# Patient Record
Sex: FEMALE | Race: WHITE | HISPANIC OR LATINO
[De-identification: names, ages, dates, MRNs, and addresses within clinical notes are randomized per-mention and may not be internally consistent; named-entity substitution may affect disease eponyms.]

---

## 2018-10-29 ENCOUNTER — CHARTING TRANS (OUTPATIENT)
Dept: OTHER | Age: 23
End: 2018-10-29

## 2018-10-31 ENCOUNTER — CHARTING TRANS (OUTPATIENT)
Dept: OTHER | Age: 23
End: 2018-10-31

## 2018-11-27 VITALS — TEMPERATURE: 98.2 F

## 2019-10-22 ENCOUNTER — HOSPITAL ENCOUNTER (OUTPATIENT)
Age: 24
Discharge: HOME | End: 2019-10-22
Attending: FAMILY MEDICINE
Payer: MEDICAID

## 2019-10-22 ENCOUNTER — HOSPITAL ENCOUNTER (OUTPATIENT)
Age: 24
Discharge: STILL ADMITTED OR ADMINISTRATIVE | End: 2019-10-22

## 2019-10-22 VITALS
SYSTOLIC BLOOD PRESSURE: 124 MMHG | WEIGHT: 130 LBS | HEIGHT: 59 IN | DIASTOLIC BLOOD PRESSURE: 79 MMHG | HEART RATE: 84 BPM | RESPIRATION RATE: 18 BRPM | OXYGEN SATURATION: 98 % | TEMPERATURE: 98 F | BODY MASS INDEX: 26.21 KG/M2

## 2019-10-22 DIAGNOSIS — B02.9 HERPES ZOSTER WITHOUT COMPLICATION: Primary | ICD-10-CM

## 2019-10-22 PROCEDURE — 99203 OFFICE O/P NEW LOW 30 MIN: CPT

## 2019-10-22 PROCEDURE — 99204 OFFICE O/P NEW MOD 45 MIN: CPT

## 2019-10-22 RX ORDER — VALACYCLOVIR HYDROCHLORIDE 1 G/1
1 TABLET, FILM COATED ORAL 3 TIMES DAILY
Qty: 21 TABLET | Refills: 0 | Status: SHIPPED | OUTPATIENT
Start: 2019-10-22 | End: 2019-10-29

## 2019-10-22 RX ORDER — MYCOPHENOLATE MOFETIL 250 MG/1
500 CAPSULE ORAL
COMMUNITY
End: 2021-04-05 | Stop reason: ALTCHOICE

## 2019-10-22 NOTE — ED PROVIDER NOTES
Patient Seen in: [facility]      History   Patient presents with:  Rash Skin Problem (integumentary)  Upper Extremity Injury (musculoskeletal)  Neck Pain (musculoskeletal, neurologic)    Stated Complaint: left sjoulder pain cooperative  Head: Normocephalic, without obvious abnormality, atraumatic  Eyes: conjunctivae/corneas clear. PERRL, EOM's intact. Fundi benign. Ears: normal TM's and external ear canals both ears  Nose: Nares normal. Septum midline.  Mucosa normal. No drai

## 2019-10-22 NOTE — ED INITIAL ASSESSMENT (HPI)
Pt c/o rash behind left ear and to her left scalp. Pt also c/o left shoulder and neck pain. Pt states she was working out before shoulder and neck pain started.

## 2020-01-18 ENCOUNTER — OFFICE VISIT (OUTPATIENT)
Dept: FAMILY MEDICINE CLINIC | Facility: CLINIC | Age: 25
End: 2020-01-18
Payer: MEDICAID

## 2020-01-18 VITALS
OXYGEN SATURATION: 98 % | BODY MASS INDEX: 27.62 KG/M2 | TEMPERATURE: 98 F | SYSTOLIC BLOOD PRESSURE: 110 MMHG | HEIGHT: 59 IN | WEIGHT: 137 LBS | DIASTOLIC BLOOD PRESSURE: 78 MMHG | HEART RATE: 94 BPM

## 2020-01-18 DIAGNOSIS — L73.1 INGROWN HAIR: ICD-10-CM

## 2020-01-18 DIAGNOSIS — J06.9 VIRAL URI: Primary | ICD-10-CM

## 2020-01-18 PROCEDURE — 99203 OFFICE O/P NEW LOW 30 MIN: CPT | Performed by: NURSE PRACTITIONER

## 2020-01-18 RX ORDER — BENZONATATE 200 MG/1
200 CAPSULE ORAL 3 TIMES DAILY PRN
Qty: 30 CAPSULE | Refills: 0 | Status: SHIPPED | OUTPATIENT
Start: 2020-01-18 | End: 2020-03-19

## 2020-01-18 RX ORDER — FLUTICASONE PROPIONATE 50 MCG
2 SPRAY, SUSPENSION (ML) NASAL DAILY
Qty: 1 BOTTLE | Refills: 0 | Status: SHIPPED | OUTPATIENT
Start: 2020-01-18 | End: 2020-03-19

## 2020-01-18 RX ORDER — B-COMPLEX WITH VITAMIN C
1 TABLET ORAL
COMMUNITY
Start: 2016-10-20 | End: 2021-08-13

## 2020-01-18 NOTE — PROGRESS NOTES
CHIEF COMPLAINT:   Patient presents with:  Cough: Productive/dry cough X 5 days, nasal congestion, runny nose, sore throat. No NV or fever. HPI:   Sarina Ayers is a 23year old female who presents for upper respiratory symptoms for  5 days.  Pa /78   Pulse 94   Temp 98.3 °F (36.8 °C) (Oral)   Ht 59\"   Wt 137 lb (62.1 kg)   LMP 01/06/2020   SpO2 98%   BMI 27.67 kg/m²   GENERAL: well developed, well nourished,in no apparent distress  SKIN: no rashes,no suspicious lesions.  1 small papule note You have a viral upper respiratory illness (URI), which is another term for the common cold. This illness is contagious during the first few days. It is spread through the air by coughing and sneezing.  It may also be spread by direct contact (touching the · Over-the-counter cold medicines will not shorten the length of time you’re sick, but they may be helpful for the following symptoms: cough, sore throat, and nasal and sinus congestion.  If you take prescription medicines, ask your healthcare provider or p Folliculitis can happen anywhere on the body where hair grows. It can be caused by rubbing from tight clothing. Ingrown hairs can cause it. Soaking in a hot tub or swimming pool that has bacteria in the water can cause it.  It may also occur if a hair folli · Rash does not get better with treatment  · Redness or swelling that gets worse  · Rash becomes more painful  · Foul-smelling fluid leaking from the skin  · Rash improves, but then comes back   Date Last Reviewed: 11/1/2016  © 4227-9013 [employer]

## 2020-01-18 NOTE — PATIENT INSTRUCTIONS
Viral Upper Respiratory Illness (Adult)    You have a viral upper respiratory illness (URI), which is another term for the common cold. This illness is contagious during the first few days. It is spread through the air by coughing and sneezing.  It may al · Over-the-counter cold medicines will not shorten the length of time you’re sick, but they may be helpful for the following symptoms: cough, sore throat, and nasal and sinus congestion.  If you take prescription medicines, ask your healthcare provider or p Folliculitis can happen anywhere on the body where hair grows. It can be caused by rubbing from tight clothing. Ingrown hairs can cause it. Soaking in a hot tub or swimming pool that has bacteria in the water can cause it.  It may also occur if a hair folli · Rash does not get better with treatment  · Redness or swelling that gets worse  · Rash becomes more painful  · Foul-smelling fluid leaking from the skin  · Rash improves, but then comes back   Date Last Reviewed: 11/1/2016  © 0853-8806 [employer]

## 2020-03-04 ENCOUNTER — APPOINTMENT (OUTPATIENT)
Dept: URGENT CARE | Age: 25
End: 2020-03-04

## 2020-03-05 ENCOUNTER — WALK IN (OUTPATIENT)
Dept: URGENT CARE | Age: 25
End: 2020-03-05

## 2020-03-05 DIAGNOSIS — Z11.1 SCREENING-PULMONARY TB: Primary | ICD-10-CM

## 2020-03-05 PROCEDURE — 86580 TB INTRADERMAL TEST: CPT | Performed by: NURSE PRACTITIONER

## 2020-03-08 ENCOUNTER — WALK IN (OUTPATIENT)
Dept: URGENT CARE | Age: 25
End: 2020-03-08

## 2020-03-08 DIAGNOSIS — Z11.1 ENCOUNTER FOR PPD SKIN TEST READING: Primary | ICD-10-CM

## 2020-03-08 LAB
INDURATION: 0 MM (ref 0–?)
SKIN TEST RESULT: NEGATIVE

## 2020-03-08 PROCEDURE — X1094 NO CHARGE VISIT: HCPCS | Performed by: NURSE PRACTITIONER

## 2021-03-02 PROBLEM — N05.9 NEPHRITIS DUE TO AUTOIMMUNE DISEASE (HCC): Status: ACTIVE | Noted: 2021-03-02

## 2021-03-02 PROBLEM — M35.9 NEPHRITIS DUE TO AUTOIMMUNE DISEASE (HCC): Status: ACTIVE | Noted: 2021-03-02

## 2021-03-09 PROBLEM — M32.9 LUPUS (HCC): Status: ACTIVE | Noted: 2021-03-09

## 2021-03-09 PROBLEM — O09.619 SUPERVISION OF HIGH-RISK PREGNANCY OF YOUNG PRIMIGRAVIDA: Status: ACTIVE | Noted: 2021-03-09

## 2021-03-09 PROBLEM — O09.619 SUPERVISION OF HIGH-RISK PREGNANCY OF YOUNG PRIMIGRAVIDA (HCC): Status: ACTIVE | Noted: 2021-03-09

## 2021-04-05 PROBLEM — M32.9 SYSTEMIC LUPUS ERYTHEMATOSUS (SLE) AFFECTING PREGNANCY, ANTEPARTUM (HCC): Status: ACTIVE | Noted: 2021-04-05

## 2021-04-05 PROBLEM — O99.891 SYSTEMIC LUPUS ERYTHEMATOSUS (SLE) AFFECTING PREGNANCY, ANTEPARTUM (HCC): Status: ACTIVE | Noted: 2021-04-05

## 2021-06-02 PROBLEM — R76.8 SS-A ANTIBODY POSITIVE: Status: ACTIVE | Noted: 2021-06-02

## 2021-06-29 PROBLEM — O26.12 LOW WEIGHT GAIN DURING PREGNANCY IN SECOND TRIMESTER: Status: ACTIVE | Noted: 2021-06-29

## 2021-06-29 PROBLEM — O26.12: Status: ACTIVE | Noted: 2021-06-29

## 2021-08-13 PROBLEM — D64.9 ANEMIA: Status: ACTIVE | Noted: 2021-08-13

## 2021-08-13 PROBLEM — Z34.90 PREGNANT: Status: ACTIVE | Noted: 2021-01-05

## 2021-08-13 PROBLEM — M32.14 SLE GLOMERULONEPHRITIS SYNDROME (HCC): Status: ACTIVE | Noted: 2021-08-13

## 2021-08-13 PROBLEM — Z34.90 PREGNANT (HCC): Status: ACTIVE | Noted: 2021-01-05

## 2021-08-15 PROBLEM — O09.90: Status: ACTIVE | Noted: 2021-08-15

## 2021-08-15 PROBLEM — M32.9 SYSTEMIC LUPUS ERYTHEMATOSUS (SLE) AFFECTING PREGNANCY, ANTEPARTUM (HCC): Status: ACTIVE | Noted: 2021-08-15

## 2021-08-15 PROBLEM — O99.891 SYSTEMIC LUPUS ERYTHEMATOSUS (SLE) AFFECTING PREGNANCY, ANTEPARTUM (HCC): Status: ACTIVE | Noted: 2021-08-15

## 2021-08-15 PROBLEM — O09.90 HIGH-RISK PREGNANCY SUPERVISION, UNSPECIFIED TRIMESTER: Status: ACTIVE | Noted: 2021-08-15

## 2022-05-13 ENCOUNTER — OFFICE VISIT (OUTPATIENT)
Dept: RHEUMATOLOGY | Facility: CLINIC | Age: 27
End: 2022-05-13
Payer: MEDICAID

## 2022-05-13 VITALS
HEIGHT: 59 IN | OXYGEN SATURATION: 99 % | DIASTOLIC BLOOD PRESSURE: 66 MMHG | WEIGHT: 115 LBS | SYSTOLIC BLOOD PRESSURE: 100 MMHG | BODY MASS INDEX: 23.18 KG/M2 | HEART RATE: 76 BPM

## 2022-05-13 DIAGNOSIS — M32.9 SYSTEMIC LUPUS ERYTHEMATOSUS (SLE) IN ADULT (HCC): Primary | ICD-10-CM

## 2022-05-13 DIAGNOSIS — R79.89 LOW VITAMIN D LEVEL: ICD-10-CM

## 2022-05-13 DIAGNOSIS — M32.14 LUPUS NEPHRITIS, ISN/RPS CLASS III (HCC): ICD-10-CM

## 2022-05-13 DIAGNOSIS — M32.14 SYSTEMIC LUPUS ERYTHEMATOSUS WITH GLOMERULAR DISEASE, UNSPECIFIED SLE TYPE (HCC): ICD-10-CM

## 2022-05-13 DIAGNOSIS — E61.1 IRON DEFICIENCY: ICD-10-CM

## 2022-05-13 DIAGNOSIS — Z51.81 THERAPEUTIC DRUG MONITORING: ICD-10-CM

## 2022-05-13 PROCEDURE — 3078F DIAST BP <80 MM HG: CPT | Performed by: INTERNAL MEDICINE

## 2022-05-13 PROCEDURE — 99204 OFFICE O/P NEW MOD 45 MIN: CPT | Performed by: INTERNAL MEDICINE

## 2022-05-13 PROCEDURE — 3074F SYST BP LT 130 MM HG: CPT | Performed by: INTERNAL MEDICINE

## 2022-05-13 PROCEDURE — 3008F BODY MASS INDEX DOCD: CPT | Performed by: INTERNAL MEDICINE

## 2022-05-13 RX ORDER — LIDOCAINE HYDROCHLORIDE 20 MG/ML
1 SOLUTION ORAL; TOPICAL 2 TIMES DAILY
Qty: 180 TABLET | Refills: 0 | Status: SHIPPED | OUTPATIENT
Start: 2022-05-13

## 2022-05-13 RX ORDER — ERGOCALCIFEROL 1.25 MG/1
50000 CAPSULE ORAL WEEKLY
Qty: 12 CAPSULE | Refills: 0 | Status: SHIPPED | OUTPATIENT
Start: 2022-05-13

## 2022-05-13 NOTE — PATIENT INSTRUCTIONS
Vitamin D: take every week    Get repeat Urine tests now to recheck urine studies further away from period. Get blood work with next set of labs with Dr. Lexii Oreilly.     Read more about azathioprine

## 2022-05-18 ENCOUNTER — TELEPHONE (OUTPATIENT)
Dept: RHEUMATOLOGY | Facility: CLINIC | Age: 27
End: 2022-05-18

## 2022-05-18 NOTE — TELEPHONE ENCOUNTER
Talked to Dr. Emmanuel Mayberry, discussed plan. I will reach out to patient, options are 1) stop breastfeeding and resume MMF, 2) resume breastfeeding and start azathioprine.     I will reach out to patient

## 2022-05-26 ENCOUNTER — PATIENT MESSAGE (OUTPATIENT)
Dept: RHEUMATOLOGY | Facility: CLINIC | Age: 27
End: 2022-05-26

## 2022-06-07 ENCOUNTER — PATIENT MESSAGE (OUTPATIENT)
Dept: RHEUMATOLOGY | Facility: CLINIC | Age: 27
End: 2022-06-07

## 2022-06-07 NOTE — TELEPHONE ENCOUNTER
Pt returned our call; advised pt to review MyChart and respond via MyChart or call with any questions. She verbalized understanding and agreed to this plan.

## 2022-06-09 ENCOUNTER — TELEPHONE (OUTPATIENT)
Dept: RHEUMATOLOGY | Facility: CLINIC | Age: 27
End: 2022-06-09

## 2022-06-09 DIAGNOSIS — Z51.81 THERAPEUTIC DRUG MONITORING: ICD-10-CM

## 2022-06-09 DIAGNOSIS — M32.9 SYSTEMIC LUPUS ERYTHEMATOSUS (SLE) IN ADULT (HCC): Primary | ICD-10-CM

## 2022-06-24 ENCOUNTER — TELEPHONE (OUTPATIENT)
Dept: RHEUMATOLOGY | Facility: CLINIC | Age: 27
End: 2022-06-24

## 2022-06-29 ENCOUNTER — TELEPHONE (OUTPATIENT)
Dept: RHEUMATOLOGY | Facility: CLINIC | Age: 27
End: 2022-06-29

## 2022-06-30 NOTE — TELEPHONE ENCOUNTER
Jhon Willis,    The testing for the TPMT enzyme was normal. So it would be safe to start azathioprine. However, since your repeat urinalysis was normal, instead of starting azathioprine, what do you think about increasing the dose of hydroxychloroquine (plaquenil) to 200 mg twice daily (so double the current dose) for the next three months, then recheck the C3/C4 levels and urinalysis? If things look good then, we can slowly decrease the dose of hydroxychloroquine (plaquenil) back down to 200 mg daily. I think this would be a bit more straightforward than starting a new medication, then potentially changing that medication back to cellcept when you stop breastfeeding. What do you think?     Dr. Sandy Grider

## 2022-06-30 NOTE — TELEPHONE ENCOUNTER
----- Message from Jose M Early RN sent at 6/27/2022  8:46 AM CDT -----  Regarding: FW: urine tests    ----- Message -----  From: Alexsandra Banda  Sent: 6/24/2022   4:07 PM CDT  To: [facility] Clinical Staff  Subject: urine tests                                      Hello, very well. Dona been feeling well . Just still experiencing the rash on my eye. I will be seeing a dermatologist next week.

## 2022-07-14 ENCOUNTER — TELEPHONE (OUTPATIENT)
Dept: RHEUMATOLOGY | Facility: CLINIC | Age: 27
End: 2022-07-14

## 2022-07-14 NOTE — TELEPHONE ENCOUNTER
Jose Daniel Mar, can you see if pt can do a telehealth or in-person appointment today at the end of day? 5:15 or 5:30?

## 2022-07-14 NOTE — TELEPHONE ENCOUNTER
Regarding: urine tests  ----- Message from Nery Gerber RN sent at 7/13/2022 11:17 AM CDT -----  Please advise on question regarding pain and lupus     ----- Message from Chelle Silva to Marylen Lusher, MD sent at 7/13/2022 10:58 AM -----   Hello doctor, I had a question about if my Lupus levels were being checked when you made the decision to only take plaquenil   Dona been experiencing more and more joint pain. Recently and today just pAin all over my body. Feeling like a lupus flare which I havent had in 6-7 years      ----- Message -----       Quinton Johnston MD       Sent:6/30/2022 11:14 PM CDT         To:Alba Taylor    Subject:urine tests    Hello,    Yes, the urine test had no blood this time and the urine protein was very low. I did not repeat the kidney test in the blood since you just had this done in early May. Generally, we check this every 3 months if things are stable. Sent in the new prescription. The higher dose of hydroxychloroquine (plaquenil) is only for the next 3-6 months while you are breastfeeding, so this will be safe for this short period of time. Then we will decrease the dose back to a lower dose. Since you are doing well, let's have you follow-up in about 3 months and have you recheck blood work and urine tests 1-2 weeks prior to our follow-up appointment. Could you do the next set of blood work at a [email or web address]? All the best,    Dr. Carlos Alberto Wei      ----- Message -----       From:Alba Orlando       Sent:6/30/2022  2:43 PM CDT         To:Madhu Wei MD    Subject:urine tests    For my weight, and yes can you pls refill.      Also , so the labs came out normal for my lupus and kidney function?      ----- Message -----       From:Alba Orlando       Sent:6/30/2022  2:41 PM CDT         To:Madhu Wei MD    Subject:urine tests    Okay and the dose for twice a week is okay?       ----- Message -----       Quinton Johnston MD       Sent:6/30/2022 12:31 PM CDT         To:Alba Taylor    Subject:urine tests    Hi Alba,     The testing for the TPMT enzyme was normal. So it would be safe to start azathioprine. However, since your repeat urinalysis and urine protein was normal, instead of starting azathioprine, what do you think about increasing the dose of hydroxychloroquine (plaquenil) to 200 mg twice daily (so double the current dose) for the next three months, then recheck the C3/C4 levels and urinalysis? If things look good then, we can slowly decrease the dose of hydroxychloroquine (plaquenil) back down to 200 mg daily. I think this would be a bit more straightforward than starting a new medication, then potentially changing that medication back to cellcept when you stop breastfeeding. I talked to Dr. Johann Glover and she was good wit this plan. What do you think? Dr. Gael Adan         ----- Message -----       Maynor Baltazar       Sent:6/30/2022  2:29 AM CDT         Bernardo Liu MD    Subject:urine tests    Hello Doctor, is there any updates on my labs .      ----- Message -----       From:Alba Cunha       Sent:6/24/2022  4:07 PM CDT         To:Madhu Adan MD    Subject:urine tests    Hello, very well. Dona been feeling well . Just still experiencing the rash on my eye. I will be seeing a dermatologist next week.       ----- Message -----       Portillo Bah MD       Sent:6/24/2022  3:03 PM CDT         To:Alba Taylor    Subject:urine tests    Hi Get Avendaño,    I received your repeat urine test.  The urine did not have any significant inflammation or proteinuria. Your urine protein creatinine ratio was 0.09. Less than 0.50 is a complete proteinuric response. The lower the number the better. So it looks like your lupus nephritis is doing very well actually. We will wait for your blood work and decide if you need that azathioprine medication I was previously discussing.     Regards,    Dr. Gael Adan

## 2022-07-14 NOTE — TELEPHONE ENCOUNTER
Regarding: urine tests  ----- Message from Kaci Negron RN sent at 7/13/2022 11:17 AM CDT -----  Please advise on question regarding pain and lupus     ----- Message from Isis Azar to Heri Robles MD sent at 7/13/2022 10:58 AM -----   Hello doctor, I had a question about if my Lupus levels were being checked when you made the decision to only take plaquenil   Dona been experiencing more and more joint pain. Recently and today just pAin all over my body. Feeling like a lupus flare which I havent had in 6-7 years      ----- Message -----       [place] MD       Sent:6/30/2022 11:14 PM CDT         To:Alba Taylor    Subject:urine tests    Hello,    Yes, the urine test had no blood this time and the urine protein was very low. I did not repeat the kidney test in the blood since you just had this done in early May. Generally, we check this every 3 months if things are stable. Sent in the new prescription. The higher dose of hydroxychloroquine (plaquenil) is only for the next 3-6 months while you are breastfeeding, so this will be safe for this short period of time. Then we will decrease the dose back to a lower dose. Since you are doing well, let's have you follow-up in about 3 months and have you recheck blood work and urine tests 1-2 weeks prior to our follow-up appointment. Could you do the next set of blood work at a [facility]? All the best,    Dr. Jeffy Field      ----- Message -----       From:Alba Galvan       Sent:6/30/2022  2:43 PM CDT         To:Madhu Field MD    Subject:urine tests    For my weight, and yes can you pls refill.      Also , so the labs came out normal for my lupus and kidney function?      ----- Message -----       From:Alba Galvan       Sent:6/30/2022  2:41 PM CDT         To:Madhu Field MD    Subject:urine tests    Okay and the dose for twice a week is okay?       ----- Message -----       [place] MD       Sent:6/30/2022 12:31 PM CDT         To:Alba Taylor    Subject:urine tests    Hi Alba,     The testing for the TPMT enzyme was normal. So it would be safe to start azathioprine. However, since your repeat urinalysis and urine protein was normal, instead of starting azathioprine, what do you think about increasing the dose of hydroxychloroquine (plaquenil) to 200 mg twice daily (so double the current dose) for the next three months, then recheck the C3/C4 levels and urinalysis? If things look good then, we can slowly decrease the dose of hydroxychloroquine (plaquenil) back down to 200 mg daily. I think this would be a bit more straightforward than starting a new medication, then potentially changing that medication back to cellcept when you stop breastfeeding. I talked to Dr. Javier Griffin and she was good wit this plan. What do you think? Dr. Santana Joseph         ----- Message -----       Juan Antonio Marr       Sent:6/30/2022  2:29 AM CDT         Meaghan Black MD    Subject:urine tests    Hello Doctor, is there any updates on my labs .      ----- Message -----       From:Alba Calixto       Sent:6/24/2022  4:07 PM CDT         To:Madhu Joseph MD    Subject:urine tests    Hello, very well. Dona been feeling well . Just still experiencing the rash on my eye. I will be seeing a dermatologist next week.       ----- Message -----       Harmony Thao MD       Sent:6/24/2022  3:03 PM CDT         To:Alba Taylor    Subject:urine tests    Hi Haleyjennifer Torres,    I received your repeat urine test.  The urine did not have any significant inflammation or proteinuria. Your urine protein creatinine ratio was 0.09. Less than 0.50 is a complete proteinuric response. The lower the number the better. So it looks like your lupus nephritis is doing very well actually. We will wait for your blood work and decide if you need that azathioprine medication I was previously discussing.     Regards,    Dr. Santana Joseph

## 2024-03-03 ENCOUNTER — HOSPITAL ENCOUNTER (EMERGENCY)
Age: 29
Discharge: HOME | End: 2024-03-03
Attending: EMERGENCY MEDICINE
Payer: MEDICAID

## 2024-03-03 VITALS
DIASTOLIC BLOOD PRESSURE: 67 MMHG | OXYGEN SATURATION: 99 % | RESPIRATION RATE: 16 BRPM | BODY MASS INDEX: 24 KG/M2 | HEART RATE: 70 BPM | TEMPERATURE: 99 F | WEIGHT: 120 LBS | SYSTOLIC BLOOD PRESSURE: 108 MMHG

## 2024-03-03 DIAGNOSIS — S05.02XA ABRASION OF LEFT CORNEA, INITIAL ENCOUNTER: Primary | ICD-10-CM

## 2024-03-03 PROCEDURE — 99284 EMERGENCY DEPT VISIT MOD MDM: CPT

## 2024-03-03 PROCEDURE — 99283 EMERGENCY DEPT VISIT LOW MDM: CPT

## 2024-03-03 RX ORDER — TETRACAINE HYDROCHLORIDE 5 MG/ML
1 SOLUTION OPHTHALMIC ONCE
Status: COMPLETED | OUTPATIENT
Start: 2024-03-03 | End: 2024-03-03

## 2024-03-03 RX ORDER — CIPROFLOXACIN HYDROCHLORIDE 3.5 MG/ML
SOLUTION/ DROPS TOPICAL
Qty: 1 EACH | Refills: 0 | Status: SHIPPED | OUTPATIENT
Start: 2024-03-03

## 2024-03-03 NOTE — ED PROVIDER NOTES
Patient Seen in: [facility] [place]      History     Chief Complaint   Patient presents with    Eye Visual Problem     Stated Complaint: left eye corneal abrasion    Subjective:   HPI    Patient is a 29-year-old female presenting to the ED with left eye irritation after she believes that her 2-year-old son scratched her on the eye.  Patient has been rubbing her eye.  Developed some minimal eyelid swelling.  No redness.  No fevers or chills.  She states her 2-year-old son woke up from his nap and was flailing his arms, ultimately scratching her left eye.  She has had some irritation and redness since then with some tearing.  No acute visual disturbance.  She does not wear contacts.  No other direct injury or trauma.  Patient does have a history of lupus and chronic kidney disease.    Objective:   Past Medical History:   Diagnosis Date    Anemia 8/13/2021    Kidney disease 2018    Lupus (HCC)               Past Surgical History:   Procedure Laterality Date    IR KIDNEY BIOPSY (RENAL) MASS                  Social History     Socioeconomic History    Marital status: Single   Tobacco Use    Smoking status: Never    Smokeless tobacco: Never   Vaping Use    Vaping Use: Never used   Substance and Sexual Activity    Alcohol use: Never    Drug use: Never    Sexual activity: Yes              Review of Systems    Positive for stated complaint: left eye corneal abrasion  Other systems are as noted in HPI.  Constitutional and vital signs reviewed.      All other systems reviewed and negative except as noted above.    Physical Exam     ED Triage Vitals [03/03/24 0134]   /74   Pulse 83   Resp 16   Temp 98.5 °F (36.9 °C)   Temp src Oral   SpO2 99 %   O2 Device None (Room air)       Current:/67   Pulse 70   Temp 98.5 °F (36.9 °C) (Oral)   Resp 16   Wt 54.4 kg   LMP 02/16/2024 (Approximate)   SpO2 99%   BMI 24.24 kg/m²     Right Eye Chart Acuity: 20/20, Uncorrected  Left Eye Chart Acuity:  20/20, Uncorrected    Physical Exam  Vitals and nursing note reviewed.   Constitutional:       General: She is not in acute distress.     Appearance: Normal appearance. She is well-developed. She is not ill-appearing or toxic-appearing.   HENT:      Head: Normocephalic and atraumatic.      Right Ear: External ear normal.      Left Ear: External ear normal.      Mouth/Throat:      Mouth: Mucous membranes are moist.      Pharynx: Oropharynx is clear.   Eyes:      General:         Left eye: Discharge (clear) present.No foreign body.      Extraocular Movements: Extraocular movements intact.      Conjunctiva/sclera:      Left eye: Left conjunctiva is injected.      Pupils:      Left eye: Corneal abrasion and fluorescein uptake present.     Cardiovascular:      Rate and Rhythm: Normal rate and regular rhythm.      Heart sounds: Normal heart sounds.   Pulmonary:      Effort: Pulmonary effort is normal.      Breath sounds: Normal breath sounds.   Musculoskeletal:      Right lower leg: No edema.      Left lower leg: No edema.   Skin:     General: Skin is warm.      Capillary Refill: Capillary refill takes less than 2 seconds.      Findings: No rash.   Neurological:      Mental Status: She is alert.   Psychiatric:         Mood and Affect: Mood normal.         Behavior: Behavior normal.               ED Course   Labs Reviewed - No data to display                   MDM      History is obtained from patient who is a good historian.  Differential diagnosis includes corneal abrasion, conjunctivitis, traumatic iritis.  Based on physical examination, patient does have fluorescein uptake as noted on examination consistent with corneal abrasion.  Discussed findings as well as plan for discharge with antibiotic eyedrops and outpatient follow-up with primary care provider or ophthalmology for reevaluation.  Patient to return to ED if symptoms worsen, persist, or new symptoms develop.  Patient is comfortable discharge plan.                                    Medical Decision Making      Disposition and Plan     Clinical Impression:  1. Abrasion of left cornea, initial encounter         Disposition:  Discharge  3/3/2024  3:26 am    Follow-up:  Marta Baptiste, APRN  [address]  [address]  [facility] [id number] [phone]    Schedule an appointment as soon as possible for a visit in 2 day(s)      [facility] in [address] [id number] [phone]  Follow up  IF SYMPTOMS WORSEN, PERSIST, OR NEW SYMPTOMS DEVEL          Medications Prescribed:  Discharge Medication List as of 3/3/2024  3:29 AM        START taking these medications    Details   ciprofloxacin 0.3 % Ophthalmic Solution 1 to 2 drops in the left eye every 2 hours while awake for the first 2 days followed by every 4 hours for the next 5 days., Normal, Disp-1 each, R-0